# Patient Record
Sex: FEMALE | Race: WHITE
[De-identification: names, ages, dates, MRNs, and addresses within clinical notes are randomized per-mention and may not be internally consistent; named-entity substitution may affect disease eponyms.]

---

## 2022-10-18 LAB
ANION GAP SERPL CALCULATED.4IONS-SCNC: 4 MMOL/L (ref 6–16)
BASOPHILS # BLD AUTO: 0.06 K/MM3 (ref 0–0.23)
BASOPHILS NFR BLD AUTO: 1 % (ref 0–2)
BUN SERPL-MCNC: 8 MG/DL (ref 8–24)
CALCIUM SERPL-MCNC: 9.4 MG/DL (ref 8.5–10.1)
CHLORIDE SERPL-SCNC: 108 MMOL/L (ref 98–108)
CO2 SERPL-SCNC: 28 MMOL/L (ref 21–32)
CREAT SERPL-MCNC: 0.84 MG/DL (ref 0.4–1)
DEPRECATED RDW RBC AUTO: 43.7 FL (ref 35.1–46.3)
EOSINOPHIL # BLD AUTO: 0.29 K/MM3 (ref 0–0.68)
EOSINOPHIL NFR BLD AUTO: 3 % (ref 0–6)
ERYTHROCYTE [DISTWIDTH] IN BLOOD BY AUTOMATED COUNT: 15.5 % (ref 11.7–14.2)
GLUCOSE SERPL-MCNC: 88 MG/DL (ref 70–99)
HCT VFR BLD AUTO: 38.7 % (ref 33–51)
HGB BLD-MCNC: 12.2 G/DL (ref 11.5–16)
IMM GRANULOCYTES # BLD AUTO: 0.03 K/MM3 (ref 0–0.1)
IMM GRANULOCYTES NFR BLD AUTO: 0 % (ref 0–1)
LYMPHOCYTES # BLD AUTO: 3.05 K/MM3 (ref 0.84–5.2)
LYMPHOCYTES NFR BLD AUTO: 35 % (ref 21–46)
MCHC RBC AUTO-ENTMCNC: 31.5 G/DL (ref 31.5–36.5)
MCV RBC AUTO: 77 FL (ref 80–100)
MONOCYTES # BLD AUTO: 0.87 K/MM3 (ref 0.16–1.47)
MONOCYTES NFR BLD AUTO: 10 % (ref 4–13)
NEUTROPHILS # BLD AUTO: 4.43 K/MM3 (ref 1.96–9.15)
NEUTROPHILS NFR BLD AUTO: 51 % (ref 41–73)
NRBC # BLD AUTO: 0 K/MM3 (ref 0–0.02)
NRBC BLD AUTO-RTO: 0 /100 WBC (ref 0–0.2)
PLATELET # BLD AUTO: 450 K/MM3 (ref 150–400)
POTASSIUM SERPL-SCNC: 3.4 MMOL/L (ref 3.5–5.5)
SODIUM SERPL-SCNC: 140 MMOL/L (ref 136–145)

## 2022-10-20 ENCOUNTER — HOSPITAL ENCOUNTER (OUTPATIENT)
Dept: HOSPITAL 95 - ORSCMMR | Age: 47
Discharge: HOME | End: 2022-10-20
Attending: OBSTETRICS & GYNECOLOGY
Payer: COMMERCIAL

## 2022-10-20 VITALS — WEIGHT: 257.72 LBS | HEIGHT: 67 IN | BODY MASS INDEX: 40.45 KG/M2

## 2022-10-20 DIAGNOSIS — Z80.41: ICD-10-CM

## 2022-10-20 DIAGNOSIS — E66.01: ICD-10-CM

## 2022-10-20 DIAGNOSIS — N85.2: ICD-10-CM

## 2022-10-20 DIAGNOSIS — N92.0: Primary | ICD-10-CM

## 2022-10-20 DIAGNOSIS — J45.909: ICD-10-CM

## 2022-10-20 DIAGNOSIS — F41.9: ICD-10-CM

## 2022-10-20 DIAGNOSIS — D25.9: ICD-10-CM

## 2022-10-20 DIAGNOSIS — Z80.3: ICD-10-CM

## 2022-10-20 PROCEDURE — 0UB74ZZ EXCISION OF BILATERAL FALLOPIAN TUBES, PERCUTANEOUS ENDOSCOPIC APPROACH: ICD-10-PCS | Performed by: OBSTETRICS & GYNECOLOGY

## 2022-10-20 PROCEDURE — 0UT94ZZ RESECTION OF UTERUS, PERCUTANEOUS ENDOSCOPIC APPROACH: ICD-10-PCS | Performed by: OBSTETRICS & GYNECOLOGY

## 2022-10-20 PROCEDURE — A9270 NON-COVERED ITEM OR SERVICE: HCPCS

## 2022-10-20 PROCEDURE — 58573 TLH W/T/O UTERUS OVER 250 G: CPT

## 2022-10-20 NOTE — NUR
Ambulatory in Day Surgery
History, Chart, Medications and Allergies reviewed before start of
procedure.
Lungs clear T/O to Auscultation.
Pre-Op teaching done. Pt verbalizes understanding.
Patient States Post-Procedure ride home has been arranged with .

## 2022-10-20 NOTE — NUR
discharged
PT TOLERATING PO, NAUSEA RESOLVED.  PAIN CONTROLLED ADEQUATELY.  PT DESIRED TO
BE DC'D.  VSS.  PT AMBULATED INDEPENDENTLY IN ROOM AND VOIDED TWICE.  DC'D IV,
CATHETER INTACT.  REVIEWED DC INSTRUCTION W/PT AND SPOUSE; VERBALIZED
UNDERSTANDING.  PT LEFT UNIT IN WC ACCOMPANIED BY SPOUSE W/POSSESSIONS AND DC
PAPERWORK IN HAND.